# Patient Record
Sex: MALE | Race: WHITE | URBAN - METROPOLITAN AREA
[De-identification: names, ages, dates, MRNs, and addresses within clinical notes are randomized per-mention and may not be internally consistent; named-entity substitution may affect disease eponyms.]

---

## 2017-07-13 DIAGNOSIS — I25.10 ATHEROSCLEROSIS OF NATIVE CORONARY ARTERY OF NATIVE HEART WITHOUT ANGINA PECTORIS: ICD-10-CM

## 2017-07-13 RX ORDER — ATORVASTATIN CALCIUM 20 MG/1
20 TABLET, FILM COATED ORAL DAILY
Qty: 90 TABLET | Refills: 0 | Status: SHIPPED | OUTPATIENT
Start: 2017-07-13 | End: 2017-10-10

## 2017-08-11 ENCOUNTER — OFFICE VISIT (OUTPATIENT)
Dept: CARDIOLOGY | Facility: CLINIC | Age: 67
End: 2017-08-11
Attending: NURSE PRACTITIONER
Payer: COMMERCIAL

## 2017-08-11 VITALS
HEIGHT: 73 IN | SYSTOLIC BLOOD PRESSURE: 138 MMHG | BODY MASS INDEX: 23.99 KG/M2 | HEART RATE: 64 BPM | DIASTOLIC BLOOD PRESSURE: 73 MMHG | WEIGHT: 181 LBS

## 2017-08-11 DIAGNOSIS — Z98.61 POSTSURGICAL PERCUTANEOUS TRANSLUMINAL CORONARY ANGIOPLASTY STATUS: ICD-10-CM

## 2017-08-11 DIAGNOSIS — E78.2 MIXED HYPERLIPIDEMIA: ICD-10-CM

## 2017-08-11 DIAGNOSIS — I25.10 ATHEROSCLEROSIS OF NATIVE CORONARY ARTERY OF NATIVE HEART WITHOUT ANGINA PECTORIS: Primary | ICD-10-CM

## 2017-08-11 PROCEDURE — 99214 OFFICE O/P EST MOD 30 MIN: CPT | Performed by: INTERNAL MEDICINE

## 2017-08-11 NOTE — MR AVS SNAPSHOT
"              After Visit Summary   8/11/2017    Jb Negrete    MRN: [id number]           Patient Information     Date Of Birth          1950        Visit Information        Provider Department      8/11/2017 1:45 PM Yves Squires MD [facility]        Today's Diagnoses     Atherosclerosis of native coronary artery of native heart without angina pectoris    -  1    Postsurgical percutaneous transluminal coronary angioplasty status        Mixed hyperlipidemia           Follow-ups after your visit        Additional Services     Follow-Up with Cardiologist                 Future tests that were ordered for you today     Open Future Orders        Priority Expected Expires Ordered    Follow-Up with Cardiologist Routine 8/11/2018 12/24/2018 8/11/2017            Who to contact     If you have questions or need follow up information about today's clinic visit or your schedule please contact [facility] directly at [phone].  Normal or non-critical lab and imaging results will be communicated to you by MyChart, letter or phone within 4 business days after the clinic has received the results. If you do not hear from us within 7 days, please contact the clinic through [employer] or phone. If you have a critical or abnormal lab result, we will notify you by phone as soon as possible.  Submit refill requests through [employer] or call your pharmacy and they will forward the refill request to us. Please allow 3 business days for your refill to be completed.          Additional Information About Your Visit        MyChart Information     [employer] lets you send messages to your doctor, view your test results, renew your prescriptions, schedule appointments and more. To sign up, go to [email or web address] . Click on \"Log in\" on the left side of the screen, which will take you to the Welcome page. Then click on \"Sign up Now\" on the right side " "of the page.     You will be asked to enter the access code listed below, as well as some personal information. Please follow the directions to create your username and password.     Your access code is: [email or web address]  Expires: 2017  2:17 PM     Your access code will  in 90 days. If you need help or a new code, please call your [facility] clinic or [phone].        Care EveryWhere ID     This is your Care EveryWhere ID. This could be used by other organizations to access your [facility] medical records  [id number]        Your Vitals Were     Pulse Height BMI (Body Mass Index)             64 1.854 m (6' 1\") 23.88 kg/m2          Blood Pressure from Last 3 Encounters:   17 138/73   11/15/16 142/70   16 136/72    Weight from Last 3 Encounters:   17 82.1 kg (181 lb)   11/15/16 78.5 kg (173 lb 1.6 oz)   16 77.6 kg (171 lb)              We Performed the Following     Follow-Up with Cardiologist        Primary Care Provider Office Phone # Fax #    Hannah Wilson [phone]       [facility] [address]  [facility] [address]        Equal Access to Services     [facility]: Hadii aad ku hadasho Soomaali, waaxda luqadaha, qaybta kaalmada adeegyada, waxay idiin hayhector epperson . So [facility] [phone].    ATENCIÓN: Si habla español, tiene a perea disposición servicios gratuitos de asistencia lingüística. Llame al [phone].    We comply with applicable federal civil rights laws and [place] laws. We do not discriminate on the basis of race, color, national origin, age, disability sex, sexual orientation or gender identity.            Thank you!     Thank you for choosing [facility] [place]  for your care. Our goal is always to provide you with excellent care. Hearing back from our patients is one way we can continue to improve our services. Please take a few minutes to complete the written survey that you " may receive in the mail after your visit with us. Thank you!             Your Updated Medication List - Protect others around you: Learn how to safely use, store and throw away your medicines at www.disposemymeds.org.          This list is accurate as of: 8/11/17  2:17 PM.  Always use your most recent med list.                   Brand Name Dispense Instructions for use Diagnosis    ANDROGEL 20.25 MG/1.25GM (1.62%) Gel   Generic drug:  Testosterone      Place 20.25 mg onto the skin daily        aspirin 81 MG EC tablet     30 tablet    Take 1 tablet (81 mg) by mouth daily    CAD in native artery       atorvastatin 20 MG tablet    LIPITOR    90 tablet    Take 1 tablet (20 mg) by mouth daily    Atherosclerosis of native coronary artery of native heart without angina pectoris       B-COMPLEX/B-12 SL      Place under the tongue daily        cycloSPORINE 0.05 % ophthalmic emulsion    RESTASIS     Place 1 drop into both eyes 2 times daily        nitroGLYcerin 0.4 MG sublingual tablet    NITROSTAT    25 tablet    Place 1 tablet (0.4 mg) under the tongue every 5 minutes as needed for chest pain    CAD in native artery       VITAMIN D-3 PO      Take 2,000 Units by mouth At Bedtime

## 2017-08-11 NOTE — PROGRESS NOTES
PRIMARY CARE PHYSICIAN:  Hannah Wilson MD      HISTORY OF PRESENT ILLNESS:  I had the pleasure of seeing Jb Negrete at the [facility] in [place] this afternoon.  He is a pleasant 66-year-old gentleman with known coronary artery disease and hyperlipidemia.  I initially saw him 2 years ago when he presented with chest pain.  He underwent stress test which was markedly positive for anterior ischemia.  He subsequently proceeded to have coronary angiogram which demonstrated high-grade proximal LAD stenosis.  This was treated with a single drug-eluting stent.  His angina has resolved after that intervention.      Since the intervention, the patient has had issues with balance.  The symptoms were initially thought to be related to metoprolol.  However, they persisted despite the discontinuation of the metoprolol.  There was also concern about symptoms related to atorvastatin but the symptoms again persisted despite discontinuing the atorvastatin briefly.  He ultimately saw an ENT specialist.  He had an MRI of the brain which did not show any significant abnormalities.  He was subsequently referred to the [facility] and had a diagnosis there.  He is currently undergoing rehab for inner ear problems.  His symptoms have since resolved.      He continues to not endorse any significant cardiopulmonary symptoms.  He is active and denies exertional symptoms.      IMPRESSION AND PLAN:   1.  Coronary artery disease, status post prior PCI with drug-eluting stent placement in the proximal LAD.   2.  Hyperlipidemia.   3.  Balance issues, resolved.      He is stable from a cardiac standpoint and denies any significant cardiopulmonary symptoms.  He is on an excellent medical program which includes Aspirin and Statin. Given lack of symptoms no further testing is warranted at this point. I encouraged him to continue his active lifestyle. He plans to move [place] and might transfer his care there but we  will be happy to see him if he decides to seek cardiac care here.      I appreciate the opportunity to part of this patient's care.           SUN AKERS MD           No orders of the defined types were placed in this encounter.      No orders of the defined types were placed in this encounter.      Medications Discontinued During This Encounter   Medication Reason     clopidogrel (PLAVIX) 75 MG tablet Stopped by Patient         Encounter Diagnoses   Name Primary?     Atherosclerosis of native coronary artery of native heart without angina pectoris Yes     Postsurgical percutaneous transluminal coronary angioplasty status      Mixed hyperlipidemia        CURRENT MEDICATIONS:  Current Outpatient Prescriptions   Medication Sig Dispense Refill     atorvastatin (LIPITOR) 20 MG tablet Take 1 tablet (20 mg) by mouth daily 90 tablet 0     B Complex Vitamins (B-COMPLEX/B-12 SL) Place under the tongue daily       aspirin EC 81 MG EC tablet Take 1 tablet (81 mg) by mouth daily 30 tablet 0     nitroglycerin (NITROSTAT) 0.4 MG SL tablet Place 1 tablet (0.4 mg) under the tongue every 5 minutes as needed for chest pain 25 tablet 0     cycloSPORINE (RESTASIS) 0.05 % ophthalmic emulsion Place 1 drop into both eyes 2 times daily        Cholecalciferol (VITAMIN D-3 PO) Take 2,000 Units by mouth At Bedtime        Testosterone (ANDROGEL) 20.25 MG/1.25GM (1.62%) GEL Place 20.25 mg onto the skin daily         ALLERGIES   No Known Allergies    PAST MEDICAL HISTORY:  Past Medical History:   Diagnosis Date     CAD (coronary artery disease) 10/28/2015     Mixed hyperlipidemia 10/28/2015     Post PTCA 10-    PCI proximal LAD     Vertigo        PAST SURGICAL HISTORY:  Past Surgical History:   Procedure Laterality Date     HC LEFT HEART CATHETERIZATION  10/20/15    RADHA to LAD       FAMILY HISTORY:  Family History   Problem Relation Age of Onset     Aneurysm Father      Heart Defect Brother        SOCIAL HISTORY:  Social History  "    Social History     Marital status:      Spouse name: N/A     Number of children: N/A     Years of education: N/A     Social History Main Topics     Smoking status: Never Smoker     Smokeless tobacco: None     Alcohol use Yes      Comment: 1-2 drinks daily     Drug use: No     Sexual activity: Not Asked     Other Topics Concern     Caffeine Concern Yes     2 cups coffee daily     Sleep Concern Yes     not sleeping well since in hosp     Stress Concern No     Weight Concern No     Special Diet No     Exercise Yes     walking      Parent/Sibling W/ Cabg, Mi Or Angioplasty Before 65f 55m? No     Social History Narrative       Review of Systems:  Skin:  Negative       Eyes:  Negative      ENT:  Negative      Respiratory:  Negative       Cardiovascular:    Positive for;edema right ankle   Gastroenterology: Negative      Genitourinary:  Negative      Musculoskeletal:  Positive for joint pain    Neurologic:  Negative      Psychiatric:  Negative      Heme/Lymph/Imm:  Negative      Endocrine:  Negative        Physical Exam:  Vitals: /73  Pulse 64  Ht 1.854 m (6' 1\")  Wt 82.1 kg (181 lb)  BMI 23.88 kg/m2    Constitutional:  cooperative;in no acute distress        Skin:  warm and dry to the touch;no apparent skin lesions or masses noted        Head:  normocephalic        Eyes:  conjunctivae and lids unremarkable        ENT:  no pallor or cyanosis        Neck:  carotid pulses are full and equal bilaterally;JVP normal;no carotid bruit        Chest:  clear to auscultation          Cardiac: regular rhythm;normal S1 and S2;no murmurs, gallops or rubs detected                  Abdomen:  abdomen soft;BS normoactive;non-tender        Vascular: pulses full and equal                                        Extremities and Back:      bilateral LE edema;trace          Neurological:  no gross motor deficits              CC  Denae Becker, APRN CNP  [address]  [place] [id number]              "

## 2017-08-11 NOTE — LETTER
8/11/2017    Hannah Wilson MD  [place] [facility]   [address]  [facility] [address]    RE: Jb Negrete       Dear Colleague,    I had the pleasure of seeing Jb Negrete in the [facility].     PRIMARY CARE PHYSICIAN:  Hannah Wilson MD      HISTORY OF PRESENT ILLNESS:  I had the pleasure of seeing Jb Negrete at the [facility] in [place] this afternoon.  He is a pleasant 66-year-old gentleman with known coronary artery disease and hyperlipidemia.  I initially saw him 2 years ago when he presented with chest pain.  He underwent stress test which was markedly positive for anterior ischemia.  He subsequently proceeded to have coronary angiogram which demonstrated high-grade proximal LAD stenosis.  This was treated with a single drug-eluting stent.  His angina has resolved after that intervention.      Since the intervention, the patient has had issues with balance.  The symptoms were initially thought to be related to metoprolol.  However, they persisted despite the discontinuation of the metoprolol.  There was also concern about symptoms related to atorvastatin but the symptoms again persisted despite discontinuing the atorvastatin briefly.  He ultimately saw an ENT specialist.  He had an MRI of the brain which did not show any significant abnormalities.  He was subsequently referred to the [facility] and had a diagnosis there.  He is currently undergoing rehab for inner ear problems.  His symptoms have since resolved.      He continues to not endorse any significant cardiopulmonary symptoms.  He is active and denies exertional symptoms.      IMPRESSION AND PLAN:   1.  Coronary artery disease, status post prior PCI with drug-eluting stent placement in the proximal LAD.   2.  Hyperlipidemia.   3.  Balance issues, resolved.      He is stable from a cardiac standpoint and denies any significant cardiopulmonary symptoms.  He is on an  excellent medical program which includes Aspirin and Statin. Given lack of symptoms no further testing is warranted at this point. I encouraged him to continue his active lifestyle. He plans to move [place] and might transfer his care there but we will be happy to see him if he decides to seek cardiac care here.      I appreciate the opportunity to part of this patient's care.           SUN AKERS MD           No orders of the defined types were placed in this encounter.      No orders of the defined types were placed in this encounter.      Medications Discontinued During This Encounter   Medication Reason     clopidogrel (PLAVIX) 75 MG tablet Stopped by Patient         Encounter Diagnoses   Name Primary?     Atherosclerosis of native coronary artery of native heart without angina pectoris Yes     Postsurgical percutaneous transluminal coronary angioplasty status      Mixed hyperlipidemia        CURRENT MEDICATIONS:  Current Outpatient Prescriptions   Medication Sig Dispense Refill     atorvastatin (LIPITOR) 20 MG tablet Take 1 tablet (20 mg) by mouth daily 90 tablet 0     B Complex Vitamins (B-COMPLEX/B-12 SL) Place under the tongue daily       aspirin EC 81 MG EC tablet Take 1 tablet (81 mg) by mouth daily 30 tablet 0     nitroglycerin (NITROSTAT) 0.4 MG SL tablet Place 1 tablet (0.4 mg) under the tongue every 5 minutes as needed for chest pain 25 tablet 0     cycloSPORINE (RESTASIS) 0.05 % ophthalmic emulsion Place 1 drop into both eyes 2 times daily        Cholecalciferol (VITAMIN D-3 PO) Take 2,000 Units by mouth At Bedtime        Testosterone (ANDROGEL) 20.25 MG/1.25GM (1.62%) GEL Place 20.25 mg onto the skin daily         ALLERGIES   No Known Allergies    PAST MEDICAL HISTORY:  Past Medical History:   Diagnosis Date     CAD (coronary artery disease) 10/28/2015     Mixed hyperlipidemia 10/28/2015     Post PTCA 10-    PCI proximal LAD     Vertigo        PAST SURGICAL HISTORY:  Past Surgical History:  "  Procedure Laterality Date     HC LEFT HEART CATHETERIZATION  10/20/15    RADHA to LAD       FAMILY HISTORY:  Family History   Problem Relation Age of Onset     Aneurysm Father      Heart Defect Brother        SOCIAL HISTORY:  Social History     Social History     Marital status:      Spouse name: N/A     Number of children: N/A     Years of education: N/A     Social History Main Topics     Smoking status: Never Smoker     Smokeless tobacco: None     Alcohol use Yes      Comment: 1-2 drinks daily     Drug use: No     Sexual activity: Not Asked     Other Topics Concern     Caffeine Concern Yes     2 cups coffee daily     Sleep Concern Yes     not sleeping well since in hosp     Stress Concern No     Weight Concern No     Special Diet No     Exercise Yes     walking      Parent/Sibling W/ Cabg, Mi Or Angioplasty Before 65f 55m? No     Social History Narrative       Review of Systems:  Skin:  Negative       Eyes:  Negative      ENT:  Negative      Respiratory:  Negative       Cardiovascular:    Positive for;edema right ankle   Gastroenterology: Negative      Genitourinary:  Negative      Musculoskeletal:  Positive for joint pain    Neurologic:  Negative      Psychiatric:  Negative      Heme/Lymph/Imm:  Negative      Endocrine:  Negative        Physical Exam:  Vitals: /73  Pulse 64  Ht 1.854 m (6' 1\")  Wt 82.1 kg (181 lb)  BMI 23.88 kg/m2    Constitutional:  cooperative;in no acute distress        Skin:  warm and dry to the touch;no apparent skin lesions or masses noted        Head:  normocephalic        Eyes:  conjunctivae and lids unremarkable        ENT:  no pallor or cyanosis        Neck:  carotid pulses are full and equal bilaterally;JVP normal;no carotid bruit        Chest:  clear to auscultation          Cardiac: regular rhythm;normal S1 and S2;no murmurs, gallops or rubs detected                  Abdomen:  abdomen soft;BS normoactive;non-tender        Vascular: pulses full and equal               "                          Extremities and Back:      bilateral LE edema;trace          Neurological:  no gross motor deficits          Thank you for allowing me to participate in the care of your patient.    Sincerely,     Yves Squires MD    [facility]

## 2017-10-10 DIAGNOSIS — I25.10 ATHEROSCLEROSIS OF NATIVE CORONARY ARTERY OF NATIVE HEART WITHOUT ANGINA PECTORIS: ICD-10-CM

## 2017-10-10 RX ORDER — ATORVASTATIN CALCIUM 20 MG/1
20 TABLET, FILM COATED ORAL DAILY
Qty: 90 TABLET | Refills: 3 | Status: SHIPPED | OUTPATIENT
Start: 2017-10-10 | End: 2018-07-02

## 2018-05-21 ENCOUNTER — DOCUMENTATION ONLY (OUTPATIENT)
Dept: CARDIOLOGY | Facility: CLINIC | Age: 68
End: 2018-05-21

## 2018-05-21 NOTE — PROGRESS NOTES
Message received from scheduling:   Good afternoon,     Mr Negrete has [place] out [place] and will no longer be following with Dr Squires.     Thanks     Orders removed from patient's chart.

## 2018-06-15 NOTE — PATIENT DISCUSSION
"""Dr. Demetri Lebron reviewed results with patient today."" ""Follow ERM w/o surgery. Call if vision decreases or distortion increases.  """

## 2018-07-02 DIAGNOSIS — I25.10 ATHEROSCLEROSIS OF NATIVE CORONARY ARTERY OF NATIVE HEART WITHOUT ANGINA PECTORIS: ICD-10-CM

## 2018-07-02 RX ORDER — ATORVASTATIN CALCIUM 20 MG/1
20 TABLET, FILM COATED ORAL DAILY
Qty: 90 TABLET | Refills: 0 | Status: SHIPPED | OUTPATIENT
Start: 2018-07-02

## 2020-03-02 ENCOUNTER — NEW PATIENT COMPREHENSIVE (OUTPATIENT)
Dept: URBAN - METROPOLITAN AREA CLINIC 39 | Facility: CLINIC | Age: 70
End: 2020-03-02

## 2020-03-02 DIAGNOSIS — H43.813: ICD-10-CM

## 2020-03-02 DIAGNOSIS — H25.093: ICD-10-CM

## 2020-03-02 DIAGNOSIS — H43.391: ICD-10-CM

## 2020-03-02 DIAGNOSIS — H02.403: ICD-10-CM

## 2020-03-02 DIAGNOSIS — H35.373: ICD-10-CM

## 2020-03-02 PROCEDURE — 92134 CPTRZ OPH DX IMG PST SGM RTA: CPT

## 2020-03-02 PROCEDURE — 92015 DETERMINE REFRACTIVE STATE: CPT

## 2020-03-02 PROCEDURE — 92004 COMPRE OPH EXAM NEW PT 1/>: CPT

## 2020-03-02 ASSESSMENT — VISUAL ACUITY
OS_CC: 20/50+2
OD_CC: 20/25-2
OD_PH: 20/70
OS_PH: 20/200-1
OS_SC: CF 5FT
OD_CC: J2+
OS_CC: J2-
OD_SC: J12
OD_SC: 20/400
OS_SC: J12

## 2020-03-02 ASSESSMENT — TONOMETRY
OD_IOP_MMHG: 14
OS_IOP_MMHG: 14

## 2020-03-11 ENCOUNTER — ESTABLISHED PATIENT (OUTPATIENT)
Dept: URBAN - METROPOLITAN AREA CLINIC 39 | Facility: CLINIC | Age: 70
End: 2020-03-11

## 2020-03-11 DIAGNOSIS — H02.403: ICD-10-CM

## 2020-03-11 PROCEDURE — 92082 INTERMEDIATE VISUAL FIELD XM: CPT

## 2020-03-11 PROCEDURE — 92285 EXTERNAL OCULAR PHOTOGRAPHY: CPT

## 2020-03-11 PROCEDURE — 99213 OFFICE O/P EST LOW 20 MIN: CPT

## 2020-03-11 ASSESSMENT — VISUAL ACUITY
OS_SC: 20/70+2
OD_SC: 20/30-2
OS_PH: 20/60+2

## 2020-06-01 ENCOUNTER — CATARACT CONSULT (OUTPATIENT)
Dept: URBAN - METROPOLITAN AREA CLINIC 39 | Facility: CLINIC | Age: 70
End: 2020-06-01

## 2020-06-01 DIAGNOSIS — H35.373: ICD-10-CM

## 2020-06-01 DIAGNOSIS — H43.813: ICD-10-CM

## 2020-06-01 DIAGNOSIS — Z97.3: ICD-10-CM

## 2020-06-01 DIAGNOSIS — H25.812: ICD-10-CM

## 2020-06-01 DIAGNOSIS — H52.13: ICD-10-CM

## 2020-06-01 DIAGNOSIS — H35.3111: ICD-10-CM

## 2020-06-01 DIAGNOSIS — H43.391: ICD-10-CM

## 2020-06-01 DIAGNOSIS — H25.811: ICD-10-CM

## 2020-06-01 PROCEDURE — 92134 CPTRZ OPH DX IMG PST SGM RTA: CPT

## 2020-06-01 PROCEDURE — V2799PMN IMPRIMIS PRED-MOXI-NEPAF 5ML

## 2020-06-01 PROCEDURE — 92012 INTRM OPH EXAM EST PATIENT: CPT

## 2020-06-01 PROCEDURE — 92025-2 CORNEAL TOPOGRAPHY, PT

## 2020-06-01 PROCEDURE — 92136TC INTERFEROMETRY - TECHNICAL COMPONENT

## 2020-06-01 ASSESSMENT — VISUAL ACUITY
OD_CC: J2
OS_AM: 20/20
OS_SC: J12
OD_SC: 20/400
OS_SC: CF 4FT
OD_SC: J12
OS_CC: 20/50
OD_BAT: <20/400
OS_BAT: <20/400
OD_CC: 20/30-2
OS_CC: J2
OD_RAM: 20/20

## 2020-06-01 ASSESSMENT — TONOMETRY
OS_IOP_MMHG: 14
OD_IOP_MMHG: 14

## 2020-06-08 ENCOUNTER — CATARACT CONSULT (OUTPATIENT)
Dept: URBAN - METROPOLITAN AREA CLINIC 39 | Facility: CLINIC | Age: 70
End: 2020-06-08

## 2020-06-08 DIAGNOSIS — H25.811: ICD-10-CM

## 2020-06-08 DIAGNOSIS — H25.812: ICD-10-CM

## 2020-06-08 PROCEDURE — 92136TCNC INTERFEROMETRY -TECHNICAL COMPONENT NO CHARGE

## 2020-06-08 PROCEDURE — 99211T TECH SERVICE

## 2020-06-10 ENCOUNTER — PRE-OP/H&P (OUTPATIENT)
Dept: URBAN - METROPOLITAN AREA CLINIC 39 | Facility: CLINIC | Age: 70
End: 2020-06-10

## 2020-06-10 ENCOUNTER — SURGERY/PROCEDURE (OUTPATIENT)
Dept: URBAN - METROPOLITAN AREA CLINIC 39 | Facility: CLINIC | Age: 70
End: 2020-06-10

## 2020-06-10 DIAGNOSIS — H52.13: ICD-10-CM

## 2020-06-10 DIAGNOSIS — H43.391: ICD-10-CM

## 2020-06-10 DIAGNOSIS — H35.373: ICD-10-CM

## 2020-06-10 DIAGNOSIS — H25.811: ICD-10-CM

## 2020-06-10 DIAGNOSIS — H43.813: ICD-10-CM

## 2020-06-10 DIAGNOSIS — H25.812: ICD-10-CM

## 2020-06-10 DIAGNOSIS — Z97.3: ICD-10-CM

## 2020-06-10 DIAGNOSIS — H35.3111: ICD-10-CM

## 2020-06-10 PROCEDURE — 99211T TECH SERVICE

## 2020-06-10 PROCEDURE — 66999LNSR LENSAR LASER FOR CAT SX

## 2020-06-10 PROCEDURE — 66984CV REMOVE CATARACT, INSERT LENS, CUSTOM VISION

## 2020-06-11 ENCOUNTER — POST OP/EVAL OF SECOND EYE (OUTPATIENT)
Dept: URBAN - METROPOLITAN AREA CLINIC 39 | Facility: CLINIC | Age: 70
End: 2020-06-11

## 2020-06-11 DIAGNOSIS — H35.373: ICD-10-CM

## 2020-06-11 DIAGNOSIS — H25.812: ICD-10-CM

## 2020-06-11 DIAGNOSIS — Z96.1: ICD-10-CM

## 2020-06-11 PROCEDURE — 99024 POSTOP FOLLOW-UP VISIT: CPT

## 2020-06-11 PROCEDURE — 99213 OFFICE O/P EST LOW 20 MIN: CPT

## 2020-06-11 ASSESSMENT — VISUAL ACUITY
OS_SC: CF 5FT
OD_SC: 20/20-2

## 2020-06-11 ASSESSMENT — TONOMETRY
OD_IOP_MMHG: 14
OS_IOP_MMHG: 14

## 2020-06-17 ENCOUNTER — PRE-OP/H&P (OUTPATIENT)
Dept: URBAN - METROPOLITAN AREA CLINIC 39 | Facility: CLINIC | Age: 70
End: 2020-06-17

## 2020-06-17 ENCOUNTER — SURGERY/PROCEDURE (OUTPATIENT)
Dept: URBAN - METROPOLITAN AREA CLINIC 39 | Facility: CLINIC | Age: 70
End: 2020-06-17

## 2020-06-17 DIAGNOSIS — Z96.1: ICD-10-CM

## 2020-06-17 DIAGNOSIS — H25.812: ICD-10-CM

## 2020-06-17 PROCEDURE — 66984CV REMOVE CATARACT, INSERT LENS, CUSTOM VISION

## 2020-06-17 PROCEDURE — 65772LRI LRI DURING CAT SX

## 2020-06-17 PROCEDURE — 99211T TECH SERVICE

## 2020-06-17 PROCEDURE — 66999LNSR LENSAR LASER FOR CAT SX

## 2020-06-17 ASSESSMENT — VISUAL ACUITY
OD_SC: J10
OS_SC: J12
OD_SC: 20/25
OS_SC: CF 5FT

## 2020-06-17 ASSESSMENT — TONOMETRY
OD_IOP_MMHG: 13
OS_IOP_MMHG: 14

## 2020-06-18 ENCOUNTER — 1 DAY POST-OP (OUTPATIENT)
Dept: URBAN - METROPOLITAN AREA CLINIC 39 | Facility: CLINIC | Age: 70
End: 2020-06-18

## 2020-06-18 DIAGNOSIS — Z96.1: ICD-10-CM

## 2020-06-18 PROCEDURE — 99024 POSTOP FOLLOW-UP VISIT: CPT

## 2020-06-18 ASSESSMENT — VISUAL ACUITY
OS_SC: 20/25+1
OD_SC: 20/25-1

## 2020-06-18 ASSESSMENT — TONOMETRY
OS_IOP_MMHG: 14
OD_IOP_MMHG: 14

## 2020-07-02 ENCOUNTER — POST-OP CATARACT (OUTPATIENT)
Dept: URBAN - METROPOLITAN AREA CLINIC 39 | Facility: CLINIC | Age: 70
End: 2020-07-02

## 2020-07-02 DIAGNOSIS — Z96.1: ICD-10-CM

## 2020-07-02 PROCEDURE — 99024 POSTOP FOLLOW-UP VISIT: CPT

## 2020-07-02 ASSESSMENT — TONOMETRY
OD_IOP_MMHG: 14
OS_IOP_MMHG: 14

## 2020-07-02 ASSESSMENT — VISUAL ACUITY
OD_SC: 20/25+2
OS_SC: 20/30-2

## 2020-09-11 NOTE — PATIENT DISCUSSION
"""Patient had two CVA's the second after open heart sx. Severe visual field constriction. Stable.  """

## 2020-12-16 ENCOUNTER — CONSULT (OUTPATIENT)
Dept: URBAN - METROPOLITAN AREA CLINIC 39 | Facility: CLINIC | Age: 70
End: 2020-12-16

## 2020-12-16 DIAGNOSIS — H02.403: ICD-10-CM

## 2020-12-16 PROCEDURE — 92285 EXTERNAL OCULAR PHOTOGRAPHY: CPT

## 2020-12-16 PROCEDURE — 99213 OFFICE O/P EST LOW 20 MIN: CPT

## 2020-12-16 ASSESSMENT — VISUAL ACUITY
OD_SC: 20/40-1
OS_SC: 20/60-1

## 2021-01-04 ENCOUNTER — PRE-OP/H&P (OUTPATIENT)
Dept: URBAN - METROPOLITAN AREA CLINIC 39 | Facility: CLINIC | Age: 71
End: 2021-01-04

## 2021-01-04 DIAGNOSIS — H25.093: ICD-10-CM

## 2021-01-04 DIAGNOSIS — H02.142: ICD-10-CM

## 2021-01-04 DIAGNOSIS — H43.391: ICD-10-CM

## 2021-01-04 DIAGNOSIS — L98.8: ICD-10-CM

## 2021-01-04 DIAGNOSIS — H02.832: ICD-10-CM

## 2021-01-04 DIAGNOSIS — H35.373: ICD-10-CM

## 2021-01-04 DIAGNOSIS — H43.813: ICD-10-CM

## 2021-01-04 DIAGNOSIS — H02.835: ICD-10-CM

## 2021-01-04 DIAGNOSIS — H02.403: ICD-10-CM

## 2021-01-04 DIAGNOSIS — H02.145: ICD-10-CM

## 2021-01-04 PROCEDURE — 99211HP H&P OFFICE/OUTPATIENT VISIT, EST

## 2021-01-04 RX ORDER — TOBRAMYCIN AND DEXAMETHASONE 1; 3 MG/ML; MG/ML
1 SUSPENSION/ DROPS OPHTHALMIC
Start: 2021-01-25

## 2021-01-04 RX ORDER — CEPHALEXIN 500 MG/1
1 CAPSULE ORAL TWICE A DAY
Start: 2021-01-25

## 2021-01-04 RX ORDER — ERYTHROMYCIN 5 MG/G
OINTMENT OPHTHALMIC
Start: 2021-01-25

## 2021-01-07 ENCOUNTER — ESTABLISHED COMPREHENSIVE EXAM (OUTPATIENT)
Dept: URBAN - METROPOLITAN AREA CLINIC 39 | Facility: CLINIC | Age: 71
End: 2021-01-07

## 2021-01-07 DIAGNOSIS — H43.391: ICD-10-CM

## 2021-01-07 DIAGNOSIS — H52.201: ICD-10-CM

## 2021-01-07 DIAGNOSIS — H35.3111: ICD-10-CM

## 2021-01-07 DIAGNOSIS — H35.373: ICD-10-CM

## 2021-01-07 DIAGNOSIS — H43.813: ICD-10-CM

## 2021-01-07 PROCEDURE — 92014 COMPRE OPH EXAM EST PT 1/>: CPT

## 2021-01-07 PROCEDURE — 92015 DETERMINE REFRACTIVE STATE: CPT

## 2021-01-07 ASSESSMENT — VISUAL ACUITY
OS_SC: J10
OS_SC: 20/60
OD_SC: 20/30-1
OD_BAT: 20/30-1
OD_CC: J1
OS_BAT: 20/50
OD_SC: J6
OS_CC: J2-

## 2021-01-07 ASSESSMENT — TONOMETRY
OS_IOP_MMHG: 13
OD_IOP_MMHG: 14

## 2021-01-20 ENCOUNTER — YAG EVALUATION (OUTPATIENT)
Dept: URBAN - METROPOLITAN AREA CLINIC 39 | Facility: CLINIC | Age: 71
End: 2021-01-20

## 2021-01-20 ENCOUNTER — SURGERY/PROCEDURE (OUTPATIENT)
Dept: URBAN - METROPOLITAN AREA SURGERY 14 | Facility: SURGERY | Age: 71
End: 2021-01-20

## 2021-01-20 DIAGNOSIS — H26.493: ICD-10-CM

## 2021-01-20 PROCEDURE — 6682150 YAG CAPSULOTOMY

## 2021-01-20 PROCEDURE — 92014 COMPRE OPH EXAM EST PT 1/>: CPT

## 2021-01-20 ASSESSMENT — TONOMETRY
OD_IOP_MMHG: 14
OS_IOP_MMHG: 14

## 2021-01-20 ASSESSMENT — VISUAL ACUITY
OD_BAT: 20/40
OS_SC: 20/50
OD_SC: 20/30-1
OS_BAT: 20/50

## 2021-01-26 ENCOUNTER — SURGERY/PROCEDURE (OUTPATIENT)
Dept: URBAN - METROPOLITAN AREA SURGERY 14 | Facility: SURGERY | Age: 71
End: 2021-01-26

## 2021-01-26 ENCOUNTER — PRE-OP/H&P (OUTPATIENT)
Dept: URBAN - METROPOLITAN AREA SURGERY 14 | Facility: SURGERY | Age: 71
End: 2021-01-26

## 2021-01-26 DIAGNOSIS — H02.832: ICD-10-CM

## 2021-01-26 DIAGNOSIS — H02.835: ICD-10-CM

## 2021-01-26 DIAGNOSIS — H02.403: ICD-10-CM

## 2021-01-26 DIAGNOSIS — H02.145: ICD-10-CM

## 2021-01-26 DIAGNOSIS — Z41.1: ICD-10-CM

## 2021-01-26 DIAGNOSIS — H02.142: ICD-10-CM

## 2021-01-26 DIAGNOSIS — L98.8: ICD-10-CM

## 2021-01-26 PROCEDURE — 6790450 REPAIR EYELID DEFECT

## 2021-01-26 PROCEDURE — 67904SF LEVATOR APONEUROSIS ADV W/ FAT COSMETIC PER EYE

## 2021-01-26 PROCEDURE — 99499 UNLISTED E&M SERVICE: CPT

## 2021-01-26 PROCEDURE — 15821 BLEPHARP LWR EYELID FAT PAD: CPT

## 2021-01-26 PROCEDURE — 6795050 CANTHOPLASTY - BILATERAL

## 2021-01-26 PROCEDURE — 15829M LOWER FACELIFT/PLATYSMAPLASTY ~ MALE

## 2021-01-27 ENCOUNTER — 1 DAY POST-OP (OUTPATIENT)
Dept: URBAN - METROPOLITAN AREA CLINIC 39 | Facility: CLINIC | Age: 71
End: 2021-01-27

## 2021-01-27 DIAGNOSIS — H02.142: ICD-10-CM

## 2021-01-27 DIAGNOSIS — H43.813: ICD-10-CM

## 2021-01-27 DIAGNOSIS — H35.373: ICD-10-CM

## 2021-01-27 DIAGNOSIS — H02.403: ICD-10-CM

## 2021-01-27 DIAGNOSIS — L98.8: ICD-10-CM

## 2021-01-27 DIAGNOSIS — Z98.890: ICD-10-CM

## 2021-01-27 DIAGNOSIS — H02.835: ICD-10-CM

## 2021-01-27 DIAGNOSIS — H43.391: ICD-10-CM

## 2021-01-27 DIAGNOSIS — H02.832: ICD-10-CM

## 2021-01-27 DIAGNOSIS — H25.093: ICD-10-CM

## 2021-01-27 DIAGNOSIS — H02.145: ICD-10-CM

## 2021-01-27 PROCEDURE — 99024 POSTOP FOLLOW-UP VISIT: CPT

## 2021-01-27 ASSESSMENT — VISUAL ACUITY
OD_SC: 20/30-1
OS_SC: 20/50-2

## 2021-01-29 ENCOUNTER — POST-OP (OUTPATIENT)
Dept: URBAN - METROPOLITAN AREA CLINIC 42 | Facility: CLINIC | Age: 71
End: 2021-01-29

## 2021-01-29 DIAGNOSIS — Z98.890: ICD-10-CM

## 2021-01-29 PROCEDURE — 99024 POSTOP FOLLOW-UP VISIT: CPT

## 2021-02-02 ENCOUNTER — POST-OP (OUTPATIENT)
Dept: URBAN - METROPOLITAN AREA CLINIC 39 | Facility: CLINIC | Age: 71
End: 2021-02-02

## 2021-02-02 DIAGNOSIS — Z98.890: ICD-10-CM

## 2021-02-02 DIAGNOSIS — H43.391: ICD-10-CM

## 2021-02-02 DIAGNOSIS — H02.145: ICD-10-CM

## 2021-02-02 DIAGNOSIS — H43.813: ICD-10-CM

## 2021-02-02 DIAGNOSIS — L98.8: ICD-10-CM

## 2021-02-02 DIAGNOSIS — H25.093: ICD-10-CM

## 2021-02-02 DIAGNOSIS — H02.832: ICD-10-CM

## 2021-02-02 DIAGNOSIS — H02.835: ICD-10-CM

## 2021-02-02 DIAGNOSIS — H02.403: ICD-10-CM

## 2021-02-02 DIAGNOSIS — H02.142: ICD-10-CM

## 2021-02-02 DIAGNOSIS — H35.373: ICD-10-CM

## 2021-02-02 PROCEDURE — 99024 POSTOP FOLLOW-UP VISIT: CPT

## 2021-02-02 ASSESSMENT — VISUAL ACUITY
OS_SC: 20/40-1
OD_SC: 20/40

## 2021-02-09 ENCOUNTER — POST-OP (OUTPATIENT)
Dept: URBAN - METROPOLITAN AREA CLINIC 39 | Facility: CLINIC | Age: 71
End: 2021-02-09

## 2021-02-09 DIAGNOSIS — H02.832: ICD-10-CM

## 2021-02-09 DIAGNOSIS — Z98.890: ICD-10-CM

## 2021-02-09 DIAGNOSIS — H43.813: ICD-10-CM

## 2021-02-09 DIAGNOSIS — H02.142: ICD-10-CM

## 2021-02-09 DIAGNOSIS — H02.145: ICD-10-CM

## 2021-02-09 DIAGNOSIS — H43.391: ICD-10-CM

## 2021-02-09 DIAGNOSIS — H25.093: ICD-10-CM

## 2021-02-09 DIAGNOSIS — H35.373: ICD-10-CM

## 2021-02-09 DIAGNOSIS — H02.835: ICD-10-CM

## 2021-02-09 DIAGNOSIS — H02.403: ICD-10-CM

## 2021-02-09 DIAGNOSIS — L98.8: ICD-10-CM

## 2021-02-09 PROCEDURE — 99024 POSTOP FOLLOW-UP VISIT: CPT

## 2021-02-09 ASSESSMENT — VISUAL ACUITY
OD_SC: 20/30-2
OS_SC: 20/50-1

## 2021-03-02 ENCOUNTER — POST-OP (OUTPATIENT)
Dept: URBAN - METROPOLITAN AREA CLINIC 39 | Facility: CLINIC | Age: 71
End: 2021-03-02

## 2021-03-02 DIAGNOSIS — Z98.890: ICD-10-CM

## 2021-03-02 PROCEDURE — 99024 POSTOP FOLLOW-UP VISIT: CPT

## 2021-03-02 ASSESSMENT — VISUAL ACUITY
OS_PH: 20/40-2
OS_SC: 20/50-2
OD_SC: 20/25-1

## 2021-09-16 ENCOUNTER — ESTABLISHED COMPREHENSIVE EXAM (OUTPATIENT)
Dept: URBAN - METROPOLITAN AREA CLINIC 39 | Facility: CLINIC | Age: 71
End: 2021-09-16

## 2021-09-16 DIAGNOSIS — H52.12: ICD-10-CM

## 2021-09-16 DIAGNOSIS — H43.391: ICD-10-CM

## 2021-09-16 DIAGNOSIS — H35.373: ICD-10-CM

## 2021-09-16 DIAGNOSIS — H52.201: ICD-10-CM

## 2021-09-16 DIAGNOSIS — H43.813: ICD-10-CM

## 2021-09-16 PROCEDURE — 92014 COMPRE OPH EXAM EST PT 1/>: CPT

## 2021-09-16 PROCEDURE — 92015 DETERMINE REFRACTIVE STATE: CPT

## 2021-09-16 PROCEDURE — 92134 CPTRZ OPH DX IMG PST SGM RTA: CPT

## 2021-09-16 ASSESSMENT — VISUAL ACUITY
OS_CC: J1
OS_SC: J12
OD_CC: J1
OD_SC: J6
OD_SC: 20/20-1
OS_SC: 20/40

## 2021-09-16 ASSESSMENT — TONOMETRY
OS_IOP_MMHG: 10
OD_IOP_MMHG: 10

## 2021-12-13 NOTE — PATIENT DISCUSSION
History of CVA's the second after open heart sx. Severe visual field constriction. Patient to follow up with Dr. Augustus Harris. HVF needed for comparison.

## 2022-03-04 NOTE — PATIENT DISCUSSION
Patient complians of blurred vision with glasses. Has episodes of blood sugar being elevated in November/December. Advised increased blood sugar could cause vision flucuation. Patient has been working on getting blood sugars stable. Will schedule refraction at next visit.

## 2022-03-04 NOTE — PATIENT DISCUSSION
Decreased IOP with Brimonidine drops. Continue current Brimonidine drop therapy. Schedule next available HVF/RNFL OCT. Patient to be called with the results. Follow up in 6 months for dilted exam with refraction.

## 2022-03-04 NOTE — PATIENT DISCUSSION
History of CVA's the second after open heart sx. Severe visual field constriction. Patient to follow up with Dr. Renay Gonzalez. HVF needed for comparison.

## 2022-06-14 NOTE — PATIENT DISCUSSION
Removed FB from superior conjunctiva. No corneal abrasion. Reassured there is no evidence of ocular damage. Will provide a sample of artificial tears. Verbal consent obtained. No need for antibiotic gtts.

## 2022-06-14 NOTE — PATIENT DISCUSSION
Decreased IOP with Brimonidine drops. Continue current Brimonidine drop therapy. Schedule next available HVF/RNFL OCT. Patient to be called with the results. Follow up in 6 months for dilted exam with refraction.-Left from last visit.

## 2022-06-14 NOTE — PATIENT DISCUSSION
History of CVA's the second after open heart sx. Severe visual field constriction. Patient to follow up with Dr. Shana Bullock. HVF needed for comparison.

## 2023-01-20 NOTE — PATIENT DISCUSSION
HVF/RNFL COT testing was reviewed with patient. The IOP is in the target range OU. Patient is currently using Brimonidine Qhs. Advised they should be used OU BID. Follow up in 6 months for IOP check.

## 2023-04-10 ENCOUNTER — COMPREHENSIVE EXAM (OUTPATIENT)
Dept: URBAN - METROPOLITAN AREA CLINIC 39 | Facility: CLINIC | Age: 73
End: 2023-04-10

## 2023-04-10 DIAGNOSIS — H43.813: ICD-10-CM

## 2023-04-10 DIAGNOSIS — H52.223: ICD-10-CM

## 2023-04-10 DIAGNOSIS — H43.391: ICD-10-CM

## 2023-04-10 DIAGNOSIS — H35.373: ICD-10-CM

## 2023-04-10 PROCEDURE — 92014 COMPRE OPH EXAM EST PT 1/>: CPT

## 2023-04-10 PROCEDURE — 92015 DETERMINE REFRACTIVE STATE: CPT

## 2023-04-10 PROCEDURE — 92134 CPTRZ OPH DX IMG PST SGM RTA: CPT

## 2023-04-10 ASSESSMENT — VISUAL ACUITY
OD_CC: J1
OD_SC: 20/25-2
OS_SC: J12
OS_SC: 20/40
OD_SC: J8-
OS_CC: J1

## 2023-04-10 ASSESSMENT — TONOMETRY
OS_IOP_MMHG: 10
OD_IOP_MMHG: 10

## 2024-04-25 ENCOUNTER — COMPREHENSIVE EXAM (OUTPATIENT)
Dept: URBAN - METROPOLITAN AREA CLINIC 39 | Facility: CLINIC | Age: 74
End: 2024-04-25

## 2024-04-25 DIAGNOSIS — H35.373: ICD-10-CM

## 2024-04-25 DIAGNOSIS — H52.223: ICD-10-CM

## 2024-04-25 DIAGNOSIS — H43.813: ICD-10-CM

## 2024-04-25 DIAGNOSIS — H43.391: ICD-10-CM

## 2024-04-25 DIAGNOSIS — H52.13: ICD-10-CM

## 2024-04-25 PROCEDURE — 92015 DETERMINE REFRACTIVE STATE: CPT

## 2024-04-25 PROCEDURE — 92014 COMPRE OPH EXAM EST PT 1/>: CPT

## 2024-04-25 PROCEDURE — 92134 CPTRZ OPH DX IMG PST SGM RTA: CPT

## 2024-04-25 ASSESSMENT — VISUAL ACUITY
OS_CC: J2
OS_SC: 20/40-2
OS_SC: J10
OD_SC: 20/25-1
OD_SC: J6-
OS_PH: 20/30
OD_CC: J1

## 2024-04-25 ASSESSMENT — TONOMETRY
OS_IOP_MMHG: 11
OD_IOP_MMHG: 11

## 2025-05-08 ENCOUNTER — DUPLICATE ENCOUNTER (OUTPATIENT)
Age: 75
End: 2025-05-08

## 2025-05-08 ENCOUNTER — COMPREHENSIVE EXAM (OUTPATIENT)
Age: 75
End: 2025-05-08

## 2025-05-08 DIAGNOSIS — H43.391: ICD-10-CM

## 2025-05-08 DIAGNOSIS — H52.223: ICD-10-CM

## 2025-05-08 DIAGNOSIS — H35.373: ICD-10-CM

## 2025-05-08 DIAGNOSIS — H43.813: ICD-10-CM

## 2025-05-08 PROCEDURE — 92015 DETERMINE REFRACTIVE STATE: CPT

## 2025-05-08 PROCEDURE — 92014 COMPRE OPH EXAM EST PT 1/>: CPT
